# Patient Record
Sex: MALE | Race: AMERICAN INDIAN OR ALASKA NATIVE
[De-identification: names, ages, dates, MRNs, and addresses within clinical notes are randomized per-mention and may not be internally consistent; named-entity substitution may affect disease eponyms.]

---

## 2019-10-07 ENCOUNTER — HOSPITAL ENCOUNTER (EMERGENCY)
Dept: HOSPITAL 5 - ED | Age: 29
LOS: 3 days | Discharge: HOME | End: 2019-10-10
Payer: SELF-PAY

## 2019-10-07 DIAGNOSIS — F23: Primary | ICD-10-CM

## 2019-10-07 DIAGNOSIS — F17.200: ICD-10-CM

## 2019-10-07 LAB
BASOPHILS # (AUTO): 0.1 K/MM3 (ref 0–0.1)
BASOPHILS NFR BLD AUTO: 0.6 % (ref 0–1.8)
BENZODIAZEPINES SCREEN,URINE: (no result)
BILIRUB UR QL STRIP: (no result)
BLOOD UR QL VISUAL: (no result)
BUN SERPL-MCNC: 14 MG/DL (ref 9–20)
BUN/CREAT SERPL: 10 %
CALCIUM SERPL-MCNC: 9.8 MG/DL (ref 8.4–10.2)
EOSINOPHIL # BLD AUTO: 0 K/MM3 (ref 0–0.4)
EOSINOPHIL NFR BLD AUTO: 0.1 % (ref 0–4.3)
HCT VFR BLD CALC: 44.7 % (ref 35.5–45.6)
HEMOLYSIS INDEX: 17
HGB BLD-MCNC: 14.9 GM/DL (ref 11.8–15.2)
LYMPHOCYTES # BLD AUTO: 1.7 K/MM3 (ref 1.2–5.4)
LYMPHOCYTES NFR BLD AUTO: 10.6 % (ref 13.4–35)
MCHC RBC AUTO-ENTMCNC: 33 % (ref 32–34)
MCV RBC AUTO: 97 FL (ref 84–94)
METHADONE SCREEN,URINE: (no result)
MONOCYTES # (AUTO): 1 K/MM3 (ref 0–0.8)
MONOCYTES % (AUTO): 6 % (ref 0–7.3)
MUCOUS THREADS #/AREA URNS HPF: (no result) /HPF
OPIATE SCREEN,URINE: (no result)
PH UR STRIP: 5 [PH] (ref 5–7)
PLATELET # BLD: 289 K/MM3 (ref 140–440)
RBC # BLD AUTO: 4.62 M/MM3 (ref 3.65–5.03)
RBC #/AREA URNS HPF: 1 /HPF (ref 0–6)
UROBILINOGEN UR-MCNC: < 2 MG/DL (ref ?–2)
WBC #/AREA URNS HPF: 2 /HPF (ref 0–6)

## 2019-10-07 PROCEDURE — 70450 CT HEAD/BRAIN W/O DYE: CPT

## 2019-10-07 PROCEDURE — 36415 COLL VENOUS BLD VENIPUNCTURE: CPT

## 2019-10-07 PROCEDURE — 85025 COMPLETE CBC W/AUTO DIFF WBC: CPT

## 2019-10-07 PROCEDURE — G0480 DRUG TEST DEF 1-7 CLASSES: HCPCS

## 2019-10-07 PROCEDURE — 81001 URINALYSIS AUTO W/SCOPE: CPT

## 2019-10-07 PROCEDURE — 80048 BASIC METABOLIC PNL TOTAL CA: CPT

## 2019-10-07 PROCEDURE — 80320 DRUG SCREEN QUANTALCOHOLS: CPT

## 2019-10-07 PROCEDURE — 80307 DRUG TEST PRSMV CHEM ANLYZR: CPT

## 2019-10-07 PROCEDURE — 96372 THER/PROPH/DIAG INJ SC/IM: CPT

## 2019-10-07 PROCEDURE — 80061 LIPID PANEL: CPT

## 2019-10-07 PROCEDURE — 83036 HEMOGLOBIN GLYCOSYLATED A1C: CPT

## 2019-10-07 PROCEDURE — 99285 EMERGENCY DEPT VISIT HI MDM: CPT

## 2019-10-07 NOTE — EMERGENCY DEPARTMENT REPORT
<RORYJOOREMA CARMENManan - Last Filed: 10/07/19 22:28>





ED Psych HPI





- General


Chief Complaint: Psych


Stated Complaint: MH EVAL


Time Seen by Provider: 10/07/19 22:24


Source: police


Mode of arrival: Ambulatory





- History of Present Illness


Initial Comments: 





Patient is 29 years old male unknown to me and no previous records for him.  

Patient brought to the emergency room via police department.  Patient found 

walking in the street naked and hallucinating  Patient is hyperverbal.  Patient 

is paranoid.  Patient denied any suicidal or homicidal ideation.  Patient with 

obvious visual and auditory hallucinations.


MD Complaint: altered mental status





- Related Data


                                Home Medications











 Medication  Instructions  Recorded  Confirmed  Last Taken


 


Unobtainable  10/07/19 10/07/19 Unknown











                                    Allergies











Allergy/AdvReac Type Severity Reaction Status Date / Time


 


Unable to Assess Allergy   Verified 10/07/19 21:20














ED Review of Systems


Comment: All other systems reviewed and negative


Constitutional: denies: chills, fever


Respiratory: denies: cough, shortness of breath, SOB with exertion


Cardiovascular: denies: chest pain, palpitations


Gastrointestinal: denies: abdominal pain, nausea, vomiting, diarrhea, 

constipation, hematemesis


Genitourinary: denies: urgency


Musculoskeletal: denies: back pain


Neurological: denies: headache, weakness





ED Past Medical Hx





- Past Medical History


Previous Medical History?: No


Additional medical history: refuses to answer





- Surgical History


Past Surgical History?: No


Additional Surgical History: pt refuses to answer





- Social History


Smoking Status: Current Every Day Smoker


Substance Use Type: Other





- Medications


Home Medications: 


                                Home Medications











 Medication  Instructions  Recorded  Confirmed  Last Taken  Type


 


Unobtainable  10/07/19 10/07/19 Unknown History














ED Physical Exam





- General


Limitations: Altered Mental Status


General appearance: alert, anxious, other (agitated)





- Head


Head exam: Present: atraumatic, normocephalic





- Eye


Eye exam: Present: normal appearance, PERRL





- ENT


ENT exam: Present: normal exam, normal orophraynx, mucous membranes moist





- Neck


Neck exam: Present: normal inspection, full ROM.  Absent: tenderness, 

meningismus, lymphadenopathy, thyromegaly





- Respiratory


Respiratory exam: Present: normal lung sounds bilaterally





- Cardiovascular


Cardiovascular Exam: Present: regular rate, normal rhythm, normal heart sounds





- GI/Abdominal


GI/Abdominal exam: Present: soft, normal bowel sounds.  Absent: distended, 

tenderness, guarding, rebound, rigid, organomegaly, mass, bruit, pulsatile mass,

hernia





- Extremities Exam


Extremities exam: Present: normal inspection, full ROM, normal capillary refill





- Neurological Exam


Neurological exam: Present: alert, altered, normal gait, reflexes normal





- Psychiatric


Psychiatric exam: Present: agitated, manic.  Absent: homicidal ideation, 

suicidal ideation





- Skin


Skin exam: Present: warm, intact, normal color





ED Medical Decision Making





- Lab Data


Result diagrams: 


                                 10/07/19 21:59








ED Disposition


Clinical Impression: 


 Acute psychosis





Disposition: DC-01 TO HOME OR SELFCARE


Condition: Stable


Referrals: 


[facility] [Outside] - 3-5 Days


[facility] [Primary Care Provider] - 3-5 Days





<ROSEMARY DEJESUS - Last Filed: 10/10/19 11:35>





ED Review of Systems


ROS: 


Stated complaint: MH EVAL


Other details as noted in HPI








ED Course





                                   Vital Signs











  10/08/19 10/08/19 10/08/19





  02:58 07:00 14:00


 


Temperature 97.5 F L 98.2 F 98.4 F


 


Pulse Rate 74 88 71


 


Respiratory 16 16 18





Rate   


 


Blood Pressure 138/88 120/67 142/63





[Left]   


 


O2 Sat by Pulse 100 100 99





Oximetry   














  10/08/19 10/09/19 10/09/19





  20:00 01:15 08:50


 


Temperature 99.1 F 97.9 F 97.5 F L


 


Pulse Rate 69 57 L 77


 


Respiratory 16 18 16





Rate   


 


Blood Pressure 127/70 116/63 110/64





[Left]   


 


O2 Sat by Pulse 99 99 99





Oximetry   














  10/09/19 10/10/19 10/10/19





  20:00 01:00 08:10


 


Temperature 98.4 F 98 F 98.3 F


 


Pulse Rate 70 54 L 88


 


Respiratory 18 14 18





Rate   


 


Blood Pressure 140/91 100/58 126/87





[Left]   


 


O2 Sat by Pulse 100 100 100





Oximetry   














ED Medical Decision Making





- Lab Data


Result diagrams: 


                                 10/07/19 21:59





                                 10/07/19 21:59





- Medical Decision Making





1013 involuntary hold has been rescinded by psychiatric team..  Reviewed 

recommendations by psychiatric team.  Mr. Munoz is discharged home.


Critical care attestation.: 


If time is entered above; I have spent that time in minutes in the direct care 

of this critically ill patient, excluding procedure time.








ED Disposition


Is pt being admited?: No


Does the pt Need Aspirin: No

## 2019-10-08 NOTE — CONSULTATION
History of Present Illness





- Reason for Consult


Consult date: 10/08/19


Reason for consult: Mental Health Evaluation


Requesting physician: CLAYTON VALADEZ





- Chief Complaint


Chief complaint: 


"I'm being who I am"








- History of Present Psychiatric Illness


29 y.o. AA male who presented to the ER for bizarre behavior (naked). today the 

patient was tangent during the assessment. He could not explain his actions 

logically prior to coming to the ER. He stated something about a "weegee board" 

causing him to act out. He denies a mental health dx, but stated that he smoke 

"weed" everyday. The patient had to be interviewed in the seclusion room because

of safety concerns. Per the staff, the patient broke out a window while in 

another seclusion room. Overall, the patient is a poor historian at this time. 

No gestures of SI/HI's.  








Medications and Allergies


                                    Allergies











Allergy/AdvReac Type Severity Reaction Status Date / Time


 


Unable to Assess Allergy   Verified 10/07/19 21:20











                                Home Medications











 Medication  Instructions  Recorded  Confirmed  Last Taken  Type


 


Unobtainable  10/07/19 10/07/19 Unknown History











Active Meds: 


Active Medications





Ziprasidone (Geodon)  20 mg IM Q12H PRN


   PRN Reason: Agitation


   Stop: 10/10/19 23:33


   Last Admin: 10/07/19 23:35 Dose:  20 mg


   Documented by: 











Past psychiatric history





- Past Medical History


Past Medical History: No medical history, other


Past Surgical History: No surgical history





- past Psychiatric treatment and history


psychiatric treatment history: 


Hx of Cannabis Use. Unable to obtain a fam psy hx.








- Social History


Social history: lives with family





Mental Status Exam





- Vital signs


                                Last Vital Signs











Temp  98.2 F   10/08/19 07:00


 


Pulse  88   10/08/19 07:00


 


Resp  16   10/08/19 07:00


 


BP  120/67   10/08/19 07:00


 


Pulse Ox  100   10/08/19 07:00














- Exam


Narrative exam: 


MSE:





 Appearance: in hospital attire 


 Behavior: regular eye contact 


 Speech: hyper verbal


 Mood: "okay" 


 Affect: congruent to mood 


 Thought Process: tangential, loose associations   


 Thought Content: no gestures of SI/HI's and AVH's, delusional    


 Motor Activity: sitting up in chair 


 Cognition: A/O x3


 Insight: poor 


 Judgment: poor  


























Results


Result Diagrams: 


                                 10/07/19 21:59





                                 10/07/19 21:59


                              Abnormal lab results











  10/07/19 10/07/19 10/07/19 Range/Units





  21:59 21:59 21:59 


 


WBC     (4.5-11.0)  K/mm3


 


MCV     (84-94)  fl


 


Lymph % (Auto)     (13.4-35.0)  %


 


Mono #     (0.0-0.8)  K/mm3


 


Seg Neutrophils %     (40.0-70.0)  %


 


Seg Neutrophils #     (1.8-7.7)  K/mm3


 


Sodium    134 L  (137-145)  mmol/L


 


Chloride    93.5 L  ()  mmol/L


 


Salicylates  < 0.3 L    (2.8-20.0)  mg/dL


 


Acetaminophen   < 5.0 L   (10.0-30.0)  ug/mL














  10/07/19 Range/Units





  21:59 


 


WBC  16.2 H  (4.5-11.0)  K/mm3


 


MCV  97 H  (84-94)  fl


 


Lymph % (Auto)  10.6 L  (13.4-35.0)  %


 


Mono #  1.0 H  (0.0-0.8)  K/mm3


 


Seg Neutrophils %  82.7 H  (40.0-70.0)  %


 


Seg Neutrophils #  13.4 H  (1.8-7.7)  K/mm3


 


Sodium   (137-145)  mmol/L


 


Chloride   ()  mmol/L


 


Salicylates   (2.8-20.0)  mg/dL


 


Acetaminophen   (10.0-30.0)  ug/mL








All other labs normal.








Assessment and Plan


Assessment and plan: 


Impression: Unspecified Psychosis. Cannabis Use DO. Today the patient was 

tangent during the assessment. WBC 16.2.





DDx: Substance Induced Psychosis





Recommendation/Plan:  Continue 1013 and start Zyprexa 5 mg PO HS for psychosis. 

Attempted to discuss possible metabolic side effects of Zyprexa with the 

patient. Baseline A1c/Lipid Panel ordered for the AM. 





Dispo; The patient was referred to inpatient psy services.





Will staff with Dr TRICIA Block.

## 2019-10-09 LAB — HDLC SERPL-MCNC: 101 MG/DL (ref 40–59)

## 2019-10-09 NOTE — EMERGENCY DEPARTMENT REPORT
Blank Doc





- Documentation


Documentation: 


This is a 29-year-old male that apparently was acutely psychotic yesterday.  He 

had a violent episode and punched through a plexiglass window in a seclusion 

area of this emergency Department.  He was given Zyprexa last night I presume.  

His drug screen was positive for marijuana.  His white blood cell count was 

approximately 16,000.  I've been asked to comment on his leukocytosis.





View the patient's vital signs show no signs of systemic immune response 

syndrome.  At the time of my encounter the patient is completely lucid.  He 

states he had a negative HIV test when he was in [facility].  He states he didn't have

any intercurrent illness or symptoms before he "started running his mouth off 

yesterday".  At this point, I do not believe he is acutely psychotic any longer.

 Review of systems: All symptoms systems reviewed and negative.





Physical exam


Gen appearance: nontoxic and well-hydrated


HEENT throat is clear or mucosa is moist


Neck supple no thyromegaly no lymphadenopathy


Chest clear to auscultation


Cardiovascular regular rhythm irregular murmur


Gastrointestinal abdomen is soft and nontender no organomegaly noted


Musculoskeletal exam no deformity for range of motion


Skin no acute lesions noted


Neurological exam awake alert and oriented no focal deficits





Assessment


Brief psychotic episode: Probable toxic delirium


Reactive leukocytosis


Medically appropriate for psychiatric hospitalization





Plan


Patient continues to be medically cleared appropriate for psychiatric 

hospitalization.  It is not medically indicated to repeat his white blood cell 

count at this time.

## 2019-10-09 NOTE — PROGRESS NOTE
Subjective





- Reason for Consult


Consult date: 10/09/19


Reason for consult: Psychiatric Follow-up Evaluation





- Chief Complaint


Chief complaint: 


"I'm good."





Patient is a 29 y.o. AA male who presented to the ER for bizarre behavior 

(naked). Today the patient is calm and cooperative during the assessment. 

Patient states, " I'm here for some foolishness. I was naked. I think I got a 

hold to some bad weed." On 10-8-19 patient became agitated and knocked out 

window. Today patient is less irritated/agitated. He reports good sleep and 

appetite. Currently, he denies SI/HI's, A/VH's, and delusions. 








 





Mental Status Exam





- Vital signs


                                Last Vital Signs











Temp  97.5 F L  10/09/19 08:50


 


Pulse  77   10/09/19 08:50


 


Resp  16   10/09/19 08:50


 


BP  110/64   10/09/19 08:50


 


Pulse Ox  99   10/09/19 08:50














- Exam


Narrative exam: 


MSE:





 Appearance: in hospital attire 


 Behavior: regular eye contact 


 Speech: hyper verbal


 Mood: "I'm good" ; less irritable


 Affect: congruent to mood 


 Thought Process: circumstantial


 Thought Content: no gestures of SI/HI's and AVH's, delusional    


 Motor Activity: sitting up in chair 


 Cognition: A/O x 3


 Insight: variable


 Judgment: variable 














Assessment and Plan


Impression: Unspecified Psychosis. Cannabis Use DO. Today the patient is calm 

and cooperative during the assessment. Thoughts are more clear. No agitation 

noted. WBC 16.2.





DDx: Substance Induced Psychosis





Recommendation/Plan:  


1. Continue 1013. Will reevaluate patient's 1013 in 24 hours


2. Continue Zyprexa 5 mg PO HS for psychosis. Attempted to discuss possible 

metabolic side effects of Zyprexa with the patient. Reviewed baseline A1c/Lipid 

Panel ordered for the AM. 





Disposition: The patient has been referred to inpatient psychiatric services.





Will staff with Dr. TRICIA Block.

## 2019-10-09 NOTE — CAT SCAN REPORT
CT head/brain wo con



INDICATION:

acute psychosis.



TECHNIQUE: Routine CT head without contrast. All CT scans at this location are performed using CT dos
e reduction for ALARA by means of automated exposure control.



COMPARISON: 

None.



FINDINGS:



BRAIN / INTRACRANIAL CONTENTS: No acute hemorrhage, mass effect, midline shift, or hydrocephalus. No 
appreciable acute large territorial or lacunar infarct. No chronic infarct or focal atrophy. Normal b
rain volume and ventricular/sulcal size for age.



ORBITS: No significant abnormality of visualized orbits.

SINUSES / MASTOIDS: No significant abnormality of visualized sinuses and mastoid air cells.



ADDITIONAL FINDINGS: None. 



IMPRESSION:

1. No acute intracranial abnormality on noncontrast CT of the brain. 



Signer Name: Irene Quach MD 

Signed: 10/9/2019 3:43 AM

 Workstation Name: [id number]

## 2019-10-10 VITALS — DIASTOLIC BLOOD PRESSURE: 87 MMHG | SYSTOLIC BLOOD PRESSURE: 126 MMHG

## 2019-10-10 NOTE — PROGRESS NOTE
Subjective





- Reason for Consult


Consult date: 10/10/19


Reason for consult: Psychiatry Follow-up





- Chief Complaint


Chief complaint: 


"I have to stop smoking weed"





Patient is a 29 y.o. AA male who presented to the ER for bizarre behavior 

(naked). Today the patient was calm and cooperative during the assessment. He 

stated that he need to stop smoking marijuana and concentrate more on having a 

better life. He stated that he is willing to attend rehab services once 

discharged. He denies SI/HI's and AVH's. He denies being paranoid. Per the 

record, no behavioral disturbances overnight by the patient. He denies any side 

effects from his medication. 





 





Mental Status Exam





- Vital signs


                                Last Vital Signs











Temp  98.3 F   10/10/19 08:10


 


Pulse  88   10/10/19 08:10


 


Resp  18   10/10/19 08:10


 


BP  126/87   10/10/19 08:10


 


Pulse Ox  100   10/10/19 08:10














- Exam


Narrative exam: 


MSE:





 Appearance: calm, cooperative  


 Behavior: regular eye contact 


 Speech: regular rate and tone 


 Mood: "okay" 


 Affect: congruent to mood 


 Thought Process: more organized   


 Thought Content: denies SI/HI's and AVH's     


 Motor Activity: ambulatory 


 Cognition: A/O x3


 Insight: fair 


 Judgment: appropriate 














Assessment and Plan


Impression: Unspecified Psychosis. Cannabis Use DO. No overt psychosis with the 

patient. Today the patient was calm and cooperative during the assessment. 





DDx: Substance Induced Psychosis





Recommendation/Plan: Rescind 1013. Continue Zyprexa 5 mg PO HS for psychosis. 

Discussed possible metabolic side effects of Zyprexa with the patient, he 

verbalized understanding. Discussed the importance to abstain from recreational 

drugs with the patient, he verbalized understanding.  





Dispo; The patient can follow up with [facility] for outpatient psy/rehab 

services. 





Will staff with Dr TRICIA Block.